# Patient Record
Sex: FEMALE | Race: WHITE | NOT HISPANIC OR LATINO | Employment: UNEMPLOYED
[De-identification: names, ages, dates, MRNs, and addresses within clinical notes are randomized per-mention and may not be internally consistent; named-entity substitution may affect disease eponyms.]

---

## 2018-07-26 ENCOUNTER — TRANSCRIBE ORDERS (OUTPATIENT)
Dept: SCHEDULING | Age: 48
End: 2018-07-26

## 2018-07-26 DIAGNOSIS — Z12.31 ENCOUNTER FOR SCREENING MAMMOGRAM FOR MALIGNANT NEOPLASM OF BREAST: Primary | ICD-10-CM

## 2018-08-17 ENCOUNTER — APPOINTMENT (EMERGENCY)
Dept: RADIOLOGY | Facility: HOSPITAL | Age: 48
End: 2018-08-17
Payer: COMMERCIAL

## 2018-08-17 ENCOUNTER — HOSPITAL ENCOUNTER (EMERGENCY)
Facility: HOSPITAL | Age: 48
Discharge: HOME | End: 2018-08-17
Attending: EMERGENCY MEDICINE | Admitting: EMERGENCY MEDICINE
Payer: COMMERCIAL

## 2018-08-17 VITALS
DIASTOLIC BLOOD PRESSURE: 73 MMHG | HEIGHT: 63 IN | TEMPERATURE: 98.7 F | SYSTOLIC BLOOD PRESSURE: 113 MMHG | HEART RATE: 80 BPM | RESPIRATION RATE: 18 BRPM | WEIGHT: 135 LBS | OXYGEN SATURATION: 97 % | BODY MASS INDEX: 23.92 KG/M2

## 2018-08-17 DIAGNOSIS — S09.90XA HEAD INJURY WITHOUT CONCUSSION OR INTRACRANIAL HEMORRHAGE, INITIAL ENCOUNTER: ICD-10-CM

## 2018-08-17 DIAGNOSIS — M25.519 SHOULDER PAIN: ICD-10-CM

## 2018-08-17 DIAGNOSIS — R55 SYNCOPE: ICD-10-CM
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
## 2018-08-17 PROCEDURE — 73030 X-RAY EXAM OF SHOULDER: CPT

## 2018-08-17 PROCEDURE — 70450 CT HEAD/BRAIN W/O DYE: CPT

## 2018-08-17 PROCEDURE — 99284 EMERGENCY DEPT VISIT MOD MDM: CPT

## 2018-08-17 PROCEDURE — 72125 CT NECK SPINE W/O DYE: CPT

## 2018-08-17 RX ORDER — SERTRALINE HYDROCHLORIDE 100 MG/1
50 TABLET, FILM COATED ORAL DAILY
COMMUNITY

## 2018-08-17 RX ORDER — BUPROPION HYDROCHLORIDE 300 MG/1
300 TABLET ORAL DAILY
COMMUNITY

## 2018-08-17 RX ORDER — SACCHAROMYCES BOULARDII 250 MG
CAPSULE ORAL
COMMUNITY

## 2018-08-17 NOTE — DISCHARGE INSTRUCTIONS
Head Injury   WHAT YOU NEED TO KNOW:   A head injury is most often caused by a blow to the head  This may occur from a fall, bicycle injury, sports injury, being struck in the head, or a motor vehicle accident  DISCHARGE INSTRUCTIONS:   Call 911 or have someone else call for any of the following:   · You cannot be woken  · You have a seizure  · You stop responding to others or you faint  · You have blurry or double vision  · Your speech becomes slurred or confused  · You have arm or leg weakness, loss of feeling, or new problems with coordination  · Your pupils are larger than usual or one pupil is a different size than the other  · You have blood or clear fluid coming out of your ears or nose  Return to the emergency department if:   · You have repeated or forceful vomiting  · You feel confused  · Your headache gets worse or becomes severe  · You or someone caring for you notices that you are harder to wake than usual   Contact your healthcare provider if:   · Your symptoms last longer than 6 weeks after the injury  · You have questions or concerns about your condition or care  Medicines:   · Acetaminophen  decreases pain  Acetaminophen is available without a doctor's order  Ask how much to take and how often to take it  Follow directions  Acetaminophen can cause liver damage if not taken correctly  · Take your medicine as directed  Contact your healthcare provider if you think your medicine is not helping or if you have side effects  Tell him or her if you are allergic to any medicine  Keep a list of the medicines, vitamins, and herbs you take  Include the amounts, and when and why you take them  Bring the list or the pill bottles to follow-up visits  Carry your medicine list with you in case of an emergency  Self-care:   · Rest  or do quiet activities for 24 to 48 hours  Limit your time watching TV, using the computer, or doing tasks that require a lot of thinking  Slowly return to your normal activities as directed  Do not play sports or do activities that may cause you to get hit in the head  Ask your healthcare provider when you can return to sports  · Apply ice  on your head for 15 to 20 minutes every hour or as directed  Use an ice pack, or put crushed ice in a plastic bag  Cover it with a towel before you apply it to your skin  Ice helps prevent tissue damage and decreases swelling and pain  · Have someone stay with you for 24 hours  or as directed  This person can monitor you for complications and call [phone]  When you are awake the person should ask you a few questions to see if you are thinking clearly  An example would be to ask your name or your address  Prevent another head injury:   · Wear a helmet that fits properly  Do this when you play sports, or ride a bike, scooter, or skateboard  Helmets help decrease your risk of a serious head injury  Talk to your healthcare provider about other ways you can protect yourself if you play sports  · Wear your seat belt every time you are in a car  This helps to decrease your risk for a head injury if you are in a car accident  Follow up with your healthcare provider as directed:  Write down your questions so you remember to ask them during your visits  © 2017 [address] Information is for End User's use only and may not be sold, redistributed or otherwise used for commercial purposes  All illustrations and images included in CareNotes® are the copyrighted property of [employer]  or [employer]  The above information is an  only  It is not intended as medical advice for individual conditions or treatments  Talk to your doctor, nurse or pharmacist before following any medical regimen to see if it is safe and effective for you  Syncope   WHAT YOU NEED TO KNOW:   What is syncope? Syncope is also called fainting or passing out   Syncope is a sudden, temporary loss of consciousness, followed by a fall from a standing or sitting position  A syncope episode is usually short  What causes syncope? Syncope is caused by a decrease in blood flow to the brain  When blood flow to the brain decreases, oxygen to the brain also decreases  Any of the following conditions may cause syncope:  · A heart condition, such as a narrow artery or an irregular heartbeat    · A medical condition such as severe anemia, uncontrolled diabetes, or a nerve disorder    · Dehydration    · Certain medicines, such as blood pressure medicines, heart medicines, or antidepressants    · Problems with the blood vessels of your brain    · A rapid drop in blood pressure after a body position change, such as moving from lying to sitting or standing    · Straining during bowel movements, a cough or sneeze, or a stressful or fearful situation    · A medical condition that affects your lungs, such as pneumonia or asthma, or hyperventilation (breathing too quickly)  What signs and symptoms may occur before syncope? · Cold, clammy, and sweaty skin     · Fast breathing and a racing, pounding heartbeat     · Feeling more tired than usual     · Nausea, a warm feeling, and sweating    · A headache, or feeling lightheaded or dizzy    · Tingling sensation or numbness     · Spots in front of your eyes, blurred vision, or double vision  How is the cause of syncope diagnosed? Your healthcare provider will examine you  He or she will ask if you have other medical conditions  He or she may order the following tests to find out what is causing your symptoms:  · Blood tests  may be done to check your electrolyte levels, such as sodium  A problem with your electrolytes can lead to syncope  · Telemetry  is continuous monitoring of your heart rhythm  Sticky pads placed on your skin connect to an EKG machine that records your heart rhythm  · An echocardiogram  is a type of ultrasound   Sound waves are used to show the structure and function of your heart  · A stress test  may show the changes that take place in your heart while it is under stress  Stress may be placed on your heart with exercise or medicine  Ask for more information about this test     · A tilt table test  is used to check your heart and your blood pressure when you change positions  You will lie on a table during this test  The table will move in different positions  The strength and rhythm of your heartbeat will be measured as the table moves  How is syncope treated? Treatment depends on the cause of your syncope  To prevent syncope from happening again, you may  need any of the following:  · Medicines  may be needed to help your heart pump strongly and regularly  Your healthcare provider may also make changes to any medicines that are causing syncope  · Tilt training  involves training yourself to stand for 10 to 30 minutes each day against a wall  This helps your body decrease the effects of posture changes and reduces the number of fainting spells  What can I do to manage syncope? · Keep a record of your syncope episodes  Include your symptoms and your activity before and after the episode  The record can help your healthcare provider find the cause of your syncope and help you manage episodes  · Sit or lie down when needed  This includes when you feel dizzy, your throat is getting tight, and your vision changes  Raise your legs above the level of your heart  · Take slow, deep breaths if you start to breathe faster with anxiety or fear  This can help decrease dizziness and the feeling that you might faint  · Check your blood pressure often  This is important if you take medicine to lower your blood pressure  Check your blood pressure when you are lying down and when you are standing  Ask how often to check during the day  Keep a record of your blood pressure numbers  Your healthcare provider may use the record to help plan your treatment  What can I do to prevent a syncope episode? · Move slowly and let yourself get used to one position before you move to another position  This is very important when you change from a lying or sitting position to a standing position  Take some deep breaths before you stand up from a lying position  Stand up slowly  Sudden movements may cause a fainting spell  Sit on the side of the bed or couch for a few minutes before you stand up  If you are on bedrest, try to be upright for about 2 hours each day, or as directed  Do not lock your legs if you are standing for a long period of time  Move your legs and bend your knees to keep blood flowing  · Follow your healthcare provider's recommendations  Your provider may  recommend that you drink more liquids to prevent dehydration  You may also need to have more salt to keep your blood pressure from dropping too low and causing syncope  Your provider will tell you how much liquid and sodium to have each day  · Watch for signs of low blood sugar  These include hunger, nervousness, sweating, and fast or fluttery heartbeats  Talk with your healthcare provider about ways to keep your blood sugar level steady  · Do not strain if you are constipated  You may faint if you strain to have a bowel movement  Walking is the best way to get your bowels moving  Eat foods high in fiber to make it easier to have a bowel movement  Good examples are high-fiber cereals, beans, vegetables, and whole-grain breads  Prune juice may help make bowel movements softer  · Be careful in hot weather  Heat can cause a syncope episode  Limit activity done outside on hot days  Physical activity in hot weather can lead to dehydration  This can cause an episode  When should I seek immediate care? · You are bleeding because you hit your head when you fainted  · You suddenly have double vision, difficulty speaking, numbness, and cannot move your arms or legs      · You have chest pain and trouble breathing  · You vomit blood or material that looks like coffee grounds  · You see blood in your bowel movement  When should I contact my healthcare provider? · You have new or worsening symptoms  · You have another syncope episode  · You have a headache, fast heartbeat, or feel too dizzy to stand up  · You have questions or concerns about your condition or care  CARE AGREEMENT:   You have the right to help plan your care  Learn about your health condition and how it may be treated  Discuss treatment options with your caregivers to decide what care you want to receive  You always have the right to refuse treatment  The above information is an  only  It is not intended as medical advice for individual conditions or treatments  Talk to your doctor, nurse or pharmacist before following any medical regimen to see if it is safe and effective for you  © 2017 [address] Information is for End User's use only and may not be sold, redistributed or otherwise used for commercial purposes  All illustrations and images included in CareNotes® are the copyrighted property of A D A M , [employer]  or [employer]

## 2018-08-17 NOTE — ED PROVIDER NOTES
History  Chief Complaint   Patient presents with    Fall     Patient states she was riding a horse when she began to feel hot - fell off the horse while it was going over a jump  patient c/o right shoulder pain  per bystanders ~2 minute LOC     Patient is a 16-year-old female presenting today with a head injury that occurred about an hour ago  Relays that she was wearing a helmet and riding her horse when she was going over a jump and subsequently fell off landing directly onto her right side and striking her head  Bystanders relays that she lost consciousness for approximately 2 min  Did not attempt to get up and ambulate  Main complaint at this point time is right shoulder pain, has a prior slap tear to the shoulder  Patient remembers the incident prior to falling, no confusion after waking  Ambulating in the ED without difficulty  Denies headache, neck pain, changes in vision, weakness, numbness, paresthesias, back pain, hip or groin pain nausea, vomiting, dizziness, confusion, decreased concentration, photophobia, lightheadedness, dizziness  Prior to Admission Medications   Prescriptions Last Dose Informant Patient Reported? Taking? 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[address] )        History reviewed  No pertinent surgical history  History reviewed  No pertinent family history  I have reviewed and agree with the history as documented      Social History   Substance Use Topics    Smoking status: Never Smoker    Smokeless tobacco: Never Used   Marita Ruiz Alcohol use No        Review of Systems   Constitutional: Negative  HENT: Negative  Eyes: Negative  Respiratory: Negative  Cardiovascular: Negative  Gastrointestinal: Negative  Genitourinary: Negative  Musculoskeletal: Positive for arthralgias  Negative for back pain, gait problem, joint swelling, myalgias, neck pain and neck stiffness  Skin: Negative  Neurological: Positive for syncope  Negative for dizziness, tremors, seizures, facial asymmetry, speech difficulty, light-headedness, numbness and headaches  All other systems reviewed and are negative  Physical Exam  Physical Exam   Constitutional: She is oriented to person, place, and time  She appears well-developed and well-nourished  Ambulating in the ED without difficulty  HENT:   Head: Normocephalic and atraumatic  Right Ear: External ear normal    Left Ear: External ear normal    Nose: Nose normal    Mouth/Throat: Oropharynx is clear and moist    Eyes: Conjunctivae and EOM are normal  Pupils are equal, round, and reactive to light  Neck: Normal range of motion  Neck supple  Cardiovascular: Normal rate, regular rhythm, normal heart sounds and intact distal pulses  Exam reveals no gallop and no friction rub  No murmur heard  Pulmonary/Chest: Effort normal and breath sounds normal  No respiratory distress  She has no wheezes  She has no rales  She exhibits no tenderness  spo2 is 97% indicating adequate oxygenation  Abdominal: Soft  Bowel sounds are normal  She exhibits no distension and no mass  There is no tenderness  There is no rebound and no guarding  No hernia  Musculoskeletal:        Arms:  Neurological: She is alert and oriented to person, place, and time  She has normal strength  No cranial nerve deficit or sensory deficit  She displays a negative Romberg sign  GCS eye subscore is 4  GCS verbal subscore is 5  GCS motor subscore is 6  Skin: Skin is warm and dry   Capillary refill takes less than 2 seconds  Nursing note and vitals reviewed  Vital Signs  ED Triage Vitals [08/17/18 1242]   Temperature Pulse Respirations Blood Pressure SpO2   98 7 °F (37 1 °C) 80 18 113/73 97 %      Temp Source Heart Rate Source Patient Position - Orthostatic VS BP Location FiO2 (%)   Oral Monitor Lying Left arm --      Pain Score       1           Vitals:    08/17/18 1242   BP: 113/73   Pulse: 80   Patient Position - Orthostatic VS: Lying       Visual Acuity      ED Medications  Medications - No data to display    Diagnostic Studies  Results Reviewed     None                 XR shoulder 2+ views RIGHT   ED Interpretation by Hira Luna PA-C (08/17 3318)   No acute osseous abnormality  Final Result by Jones Solomon MD (08/17 7694)      No acute osseous abnormality  Workstation performed: [id number]         CT head without contrast   Final Result by Chiquita Ornelas MD (08/17 1334)      No acute intracranial abnormality  Workstation performed: [id number]         CT cervical spine without contrast   Final Result by Chiquita Ornelas MD (08/17 1336)      No cervical spine fracture or traumatic malalignment  Workstation performed: [id number]                    Procedures  Procedures       Phone Contacts  ED Phone Contact    ED Course                               MDM  Number of Diagnoses or Management Options  Fall, initial encounter:   Head injury without concussion or intracranial hemorrhage, initial encounter:   Shoulder pain:   Syncope:   Diagnosis management comments: Discussed with patient the results of the imaging studies  Given significant injury, head and cervical spine CT ordered as patient has distracting injuries of the right shoulder  Will have patient remain out of sports and horse back riding x 2 weeks   No evidence of concussion at this time, however will have her follow up with her primary care doctor for re-evaluation with strict return precautions for any worsening symptoms  Patient verbalizes understanding and agrees with the above assessment plan  Amount and/or Complexity of Data Reviewed  Tests in the radiology section of CPT®: reviewed and ordered  Review and summarize past medical records: yes  Independent visualization of images, tracings, or specimens: yes      CritCare Time    Disposition  Final diagnoses:   Fall, initial encounter   Head injury without concussion or intracranial hemorrhage, initial encounter   Syncope   Shoulder pain     Time reflects when diagnosis was documented in both MDM as applicable and the Disposition within this note     Time User Action Codes Description Comment    8/17/2018  2:26 PM Rima Low [Q11  YVFP] Fall, initial encounter     8/17/2018  2:26 PM Rima Hancock Add [I37 25CK] Head injury without concussion or intracranial hemorrhage, initial encounter     8/17/2018  2:26 PM Rima Hancock Add [R55] Syncope     8/17/2018  2:26 PM Rima Hancock Add [M25 519] Shoulder pain       ED Disposition     ED Disposition Condition Comment    Discharge  Frannie Talavera discharge to home/self care  Condition at discharge: Good        Follow-up Information     Follow up With Specialties Details Why [facility]  Emergency Department Emergency Medicine Go to If symptoms worsen such as persistent vomiting, confusion, changes in vision etc otherwise please make an appointment with her primary care doctor for re-evaluation in few days  [address] [facility]  [phone] [facility], [place], [place], [address]          Patient's Medications   Discharge Prescriptions    No medications on file     No discharge procedures on file      ED Provider  Electronically Signed by           Hira Luna PA-C  08/17/18 2082

## 2018-09-27 ENCOUNTER — HOSPITAL ENCOUNTER (OUTPATIENT)
Dept: RADIOLOGY | Facility: HOSPITAL | Age: 48
Discharge: HOME | End: 2018-09-27
Attending: OBSTETRICS & GYNECOLOGY
Payer: COMMERCIAL

## 2018-09-27 ENCOUNTER — TRANSCRIBE ORDERS (OUTPATIENT)
Dept: RADIOLOGY | Facility: HOSPITAL | Age: 48
End: 2018-09-27

## 2018-09-27 DIAGNOSIS — Z12.31 ENCOUNTER FOR SCREENING MAMMOGRAM FOR MALIGNANT NEOPLASM OF BREAST: ICD-10-CM

## 2018-09-27 DIAGNOSIS — Z12.31 ENCOUNTER FOR SCREENING MAMMOGRAM FOR MALIGNANT NEOPLASM OF BREAST: Primary | ICD-10-CM

## 2018-09-27 PROCEDURE — 77067 SCR MAMMO BI INCL CAD: CPT

## 2019-07-31 ENCOUNTER — TRANSCRIBE ORDERS (OUTPATIENT)
Dept: SCHEDULING | Age: 49
End: 2019-07-31

## 2019-07-31 DIAGNOSIS — Z12.31 ENCOUNTER FOR SCREENING MAMMOGRAM FOR MALIGNANT NEOPLASM OF BREAST: Primary | ICD-10-CM

## 2019-09-30 ENCOUNTER — HOSPITAL ENCOUNTER (OUTPATIENT)
Dept: RADIOLOGY | Facility: HOSPITAL | Age: 49
Discharge: HOME | End: 2019-09-30
Attending: NURSE PRACTITIONER
Payer: COMMERCIAL

## 2019-09-30 DIAGNOSIS — Z12.31 ENCOUNTER FOR SCREENING MAMMOGRAM FOR MALIGNANT NEOPLASM OF BREAST: ICD-10-CM

## 2019-09-30 PROCEDURE — 77067 SCR MAMMO BI INCL CAD: CPT

## 2020-12-09 ENCOUNTER — TRANSCRIBE ORDERS (OUTPATIENT)
Dept: SCHEDULING | Age: 50
End: 2020-12-09

## 2020-12-09 DIAGNOSIS — Z12.31 ENCOUNTER FOR SCREENING MAMMOGRAM FOR MALIGNANT NEOPLASM OF BREAST: Primary | ICD-10-CM

## 2020-12-22 ENCOUNTER — HOSPITAL ENCOUNTER (OUTPATIENT)
Dept: RADIOLOGY | Facility: HOSPITAL | Age: 50
Discharge: HOME | End: 2020-12-22
Attending: NURSE PRACTITIONER
Payer: COMMERCIAL

## 2020-12-22 DIAGNOSIS — Z12.31 ENCOUNTER FOR SCREENING MAMMOGRAM FOR MALIGNANT NEOPLASM OF BREAST: ICD-10-CM

## 2020-12-22 PROCEDURE — 77063 BREAST TOMOSYNTHESIS BI: CPT

## 2021-04-30 ENCOUNTER — IMMUNIZATION (OUTPATIENT)
Dept: IMMUNIZATION | Facility: CLINIC | Age: 51
End: 2021-04-30

## 2022-06-09 ENCOUNTER — HOSPITAL ENCOUNTER (OUTPATIENT)
Dept: RADIOLOGY | Facility: HOSPITAL | Age: 52
Discharge: HOME | End: 2022-06-09
Attending: NURSE PRACTITIONER
Payer: COMMERCIAL

## 2022-06-09 ENCOUNTER — TRANSCRIBE ORDERS (OUTPATIENT)
Dept: RADIOLOGY | Facility: HOSPITAL | Age: 52
End: 2022-06-09

## 2022-06-09 DIAGNOSIS — Z12.31 ENCOUNTER FOR SCREENING MAMMOGRAM FOR MALIGNANT NEOPLASM OF BREAST: ICD-10-CM

## 2022-06-09 DIAGNOSIS — Z12.31 ENCOUNTER FOR SCREENING MAMMOGRAM FOR MALIGNANT NEOPLASM OF BREAST: Primary | ICD-10-CM

## 2022-06-09 PROCEDURE — 77063 BREAST TOMOSYNTHESIS BI: CPT

## 2023-08-29 ENCOUNTER — TRANSCRIBE ORDERS (OUTPATIENT)
Dept: SCHEDULING | Age: 53
End: 2023-08-29

## 2023-08-29 DIAGNOSIS — Z12.31 ENCOUNTER FOR SCREENING MAMMOGRAM FOR MALIGNANT NEOPLASM OF BREAST: Primary | ICD-10-CM

## 2023-08-30 ENCOUNTER — HOSPITAL ENCOUNTER (OUTPATIENT)
Dept: RADIOLOGY | Facility: HOSPITAL | Age: 53
Discharge: HOME | End: 2023-08-30
Attending: OBSTETRICS & GYNECOLOGY
Payer: COMMERCIAL

## 2023-08-30 DIAGNOSIS — Z12.31 ENCOUNTER FOR SCREENING MAMMOGRAM FOR MALIGNANT NEOPLASM OF BREAST: ICD-10-CM

## 2023-08-30 PROCEDURE — 77063 BREAST TOMOSYNTHESIS BI: CPT

## 2024-08-21 ENCOUNTER — TRANSCRIBE ORDERS (OUTPATIENT)
Dept: SCHEDULING | Age: 54
End: 2024-08-21

## 2024-08-21 DIAGNOSIS — M84.30XA STRESS FRACTURE, UNSPECIFIED SITE, INITIAL ENCOUNTER FOR FRACTURE: Primary | ICD-10-CM

## 2024-08-27 ENCOUNTER — HOSPITAL ENCOUNTER (OUTPATIENT)
Dept: RADIOLOGY | Facility: HOSPITAL | Age: 54
Setting detail: NUCLEAR MEDICINE
Discharge: HOME | End: 2024-08-27
Attending: PHYSICAL MEDICINE & REHABILITATION
Payer: COMMERCIAL

## 2024-08-27 DIAGNOSIS — M84.30XA STRESS FRACTURE, UNSPECIFIED SITE, INITIAL ENCOUNTER FOR FRACTURE: ICD-10-CM

## 2024-08-29 ENCOUNTER — TRANSCRIBE ORDERS (OUTPATIENT)
Dept: REGISTRATION | Facility: HOSPITAL | Age: 54
End: 2024-08-29

## 2024-08-29 ENCOUNTER — HOSPITAL ENCOUNTER (OUTPATIENT)
Dept: RADIOLOGY | Facility: HOSPITAL | Age: 54
Discharge: HOME | End: 2024-08-29
Attending: OBSTETRICS & GYNECOLOGY
Payer: COMMERCIAL

## 2024-08-29 DIAGNOSIS — Z12.31 ENCOUNTER FOR SCREENING MAMMOGRAM FOR MALIGNANT NEOPLASM OF BREAST: Primary | ICD-10-CM

## 2024-08-29 DIAGNOSIS — Z12.31 ENCOUNTER FOR SCREENING MAMMOGRAM FOR MALIGNANT NEOPLASM OF BREAST: ICD-10-CM

## 2024-08-29 PROCEDURE — 77063 BREAST TOMOSYNTHESIS BI: CPT

## 2024-09-20 ENCOUNTER — HOSPITAL ENCOUNTER (OUTPATIENT)
Dept: RADIOLOGY | Facility: HOSPITAL | Age: 54
Setting detail: NUCLEAR MEDICINE
Discharge: HOME | End: 2024-09-20
Attending: PHYSICAL MEDICINE & REHABILITATION
Payer: COMMERCIAL

## 2024-09-20 ENCOUNTER — TRANSCRIBE ORDERS (OUTPATIENT)
Dept: REGISTRATION | Facility: HOSPITAL | Age: 54
End: 2024-09-20

## 2024-09-20 PROCEDURE — A9503 TC99M MEDRONATE: HCPCS

## 2024-09-20 PROCEDURE — 78306 BONE IMAGING WHOLE BODY: CPT

## 2024-09-20 RX ADMIN — TECHNETIUM TC 99M MEDRONATE 26.6 MILLICURIE: 25 INJECTION, POWDER, FOR SOLUTION INTRAVENOUS at 09:55

## 2025-09-04 ENCOUNTER — HOSPITAL ENCOUNTER (OUTPATIENT)
Dept: RADIOLOGY | Facility: HOSPITAL | Age: 55
Discharge: HOME | End: 2025-09-04
Attending: OBSTETRICS & GYNECOLOGY | Admitting: OBSTETRICS & GYNECOLOGY
Payer: COMMERCIAL

## 2025-09-04 DIAGNOSIS — Z12.31 OTHER SCREENING MAMMOGRAM: ICD-10-CM

## 2025-09-04 PROCEDURE — 77063 BREAST TOMOSYNTHESIS BI: CPT
